# Patient Record
(demographics unavailable — no encounter records)

---

## 2021-08-08 NOTE — EDM.PDOC
ED HPI GENERAL MEDICAL PROBLEM





- General


Chief Complaint: ENT Problem


Stated Complaint: EAR INFECTION


Time Seen by Provider: 08/08/21 04:25


Source of Information: Reports: Patient, Family, Old Records, RN


History Limitations: Reports: No Limitations





- History of Present Illness


INITIAL COMMENTS - FREE TEXT/NARRATIVE: 





7 yo NA female is brought in for eval of L ear pain that she awoke with tonight.

No runny nose or fever. No tx prior to arrival. 


Onset: Today, Sudden


Onset Date: 08/08/21


Duration: Minutes:


Location: Reports: Face (L ear)


Quality: Reports: Ache


Severity: Moderate


Improves with: Reports: None


Worsens with: Reports: None


Context: Reports: Other (See HPI)


Associated Symptoms: Reports: No Other Symptoms.  Denies: Fever/Chills, 

Nausea/Vomiting


Treatments PTA: Reports: Other (see below) (none)


  ** Left Ear


Pain Score (Numeric/FACES): 4





- Related Data


                                    Allergies











Allergy/AdvReac Type Severity Reaction Status Date / Time


 


Sulfa (Sulfonamide Allergy  Rash Verified 04/05/14 01:21





Antibiotics)     











Home Meds: 


                                    Home Meds





NK [No Known Home Meds]  04/05/14 [History]











Past Medical History





- Past Health History


Medical/Surgical History: Denies Medical/Surgical History





Social & Family History





- Tobacco Use


Tobacco Use Status *Q: Never Tobacco User


Second Hand Smoke Exposure: No





- Caffeine Use


Caffeine Use: Reports: None





- Recreational Drug Use


Recreational Drug Use: No





ED ROS ENT





- Review of Systems


Review Of Systems: See Below


Constitutional: Reports: No Symptoms.  Denies: Fever, Chills


HEENT: Reports: Ear Pain (left)


Respiratory: Reports: No Symptoms


Cardiovascular: Reports: No Symptoms


Skin: Reports: No Symptoms





ED EXAM, ENT





- Physical Exam


Exam: See Below


Exam Limited By: No Limitations


General Appearance: Alert, WD/WN, No Apparent Distress, Obese


Eye Exam: Right Eye: Proptosis, Bilateral Eye: Normal Inspection


Ears: Normal External Exam, Normal Canal, Hearing Grossly Normal, Normal TMs, 

Canal Swelling (left), Other (tender with pressure over L tragus)


Nose: Normal Inspection, No Blood


Mouth/Throat: Normal Inspection, Normal Lips, Normal Oropharynx


Head: Atraumatic, Normocephalic


Neck: Normal Inspection


Respiratory/Chest: No Respiratory Distress, Lungs Clear, Normal Breath Sounds, 

No Accessory Muscle Use


Extremities: Normal Inspection


Neurological: Alert, Oriented, CN II-XII Intact, Normal Cognition, No 

Motor/Sensory Deficits


Psychiatric: Normal Affect, Normal Mood


Skin: Warm, Dry, Intact, Normal Color, No Rash





Course





- Vital Signs


Last Recorded V/S: 


                                Last Vital Signs











Temp  36.5 C   08/08/21 04:19


 


Pulse  96   08/08/21 04:19


 


Resp  24   08/08/21 04:19


 


BP  117/66   08/08/21 04:19


 


Pulse Ox  99   08/08/21 04:19














- Orders/Labs/Meds


Meds: 


Medications














Discontinued Medications














Generic Name Dose Route Start Last Admin





  Trade Name [place]  PRN Reason Stop Dose Admin


 


Ibuprofen  500 mg  08/08/21 04:32 





  Ibuprofen Susp 100 Mg/5 Ml 5 Ml Ud Cup  PO  08/08/21 04:33 





  ONETIME ONE  














Departure





- Departure


Time of Disposition: 04:38


Disposition: Home, Self-Care 01


Condition: Good


Clinical Impression: 


Left otitis externa


Qualifiers:


 Otitis externa type: swimmer's ear Chronicity: acute Qualified Code(s): H60.332

 - Swimmer's ear, left ear








- Discharge Information


*PRESCRIPTION DRUG MONITORING PROGRAM REVIEWED*: Not Applicable


*COPY OF PRESCRIPTION DRUG MONITORING REPORT IN PATIENT LILO: Not Applicable


Instructions:  Otitis Externa, Easy-to-Read


Referrals: 


PCP,None [Primary Care Provider] - 


Forms:  ED Department Discharge


Additional Instructions: 


Give ibuprofen or acetaminophen per package instructions for pain relief. Give 

Cortisporin otic suspension as directed. Keep left ear dry. Recheck with your 

doctor if not better in a week. 





Sepsis Event Note (ED)





- Focused Exam


Vital Signs: 


                                   Vital Signs











  Temp Pulse Resp BP Pulse Ox


 


 08/08/21 04:19  36.5 C  96  24  117/66  99